# Patient Record
Sex: MALE | Race: WHITE | HISPANIC OR LATINO
[De-identification: names, ages, dates, MRNs, and addresses within clinical notes are randomized per-mention and may not be internally consistent; named-entity substitution may affect disease eponyms.]

---

## 2022-06-02 PROBLEM — Z00.00 ENCOUNTER FOR PREVENTIVE HEALTH EXAMINATION: Status: ACTIVE | Noted: 2022-06-02

## 2022-06-03 ENCOUNTER — APPOINTMENT (OUTPATIENT)
Dept: UROLOGY | Facility: CLINIC | Age: 32
End: 2022-06-03

## 2022-08-05 ENCOUNTER — APPOINTMENT (OUTPATIENT)
Dept: UROLOGY | Facility: CLINIC | Age: 32
End: 2022-08-05

## 2022-08-05 ENCOUNTER — NON-APPOINTMENT (OUTPATIENT)
Age: 32
End: 2022-08-05

## 2022-08-05 VITALS
BODY MASS INDEX: 22.81 KG/M2 | HEART RATE: 78 BPM | HEIGHT: 69 IN | WEIGHT: 154 LBS | DIASTOLIC BLOOD PRESSURE: 72 MMHG | TEMPERATURE: 98.4 F | SYSTOLIC BLOOD PRESSURE: 111 MMHG

## 2022-08-05 DIAGNOSIS — R59.0 LOCALIZED ENLARGED LYMPH NODES: ICD-10-CM

## 2022-08-05 PROCEDURE — 99204 OFFICE O/P NEW MOD 45 MIN: CPT

## 2022-08-05 NOTE — HISTORY OF PRESENT ILLNESS
[FreeTextEntry1] : 31M\par comes in with complaint of swelling in groin lymph nodes every time after sexual activity with his girlfriend\par lately feels swelling over groin and shaft\par STD and UTI testing are all negative\par +rash in head of penis\par feels it could be balanitis\par "feels weird" when he urinates\par no testicular pain\par pain in groin\par no dysorgasmia \par reports its uncomfortable to touch\par

## 2022-08-05 NOTE — ASSESSMENT
[FreeTextEntry1] : inguinal adenopathy\par no skin evidence of STD or malignancy\par unusual presentation\par CT pelvis\par if neg CT consider gen surg eval

## 2022-08-05 NOTE — PHYSICAL EXAM
[Urethral Meatus] : meatus normal [Penis Abnormality] : normal uncircumcised penis [Urinary Bladder Findings] : the bladder was normal on palpation [Scrotum] : the scrotum was normal [FreeTextEntry1] : +inguinal adenopathy on exam. no erythema of penis

## 2022-08-08 LAB
APPEARANCE: CLEAR
BACTERIA: NEGATIVE
BILIRUBIN URINE: NEGATIVE
BLOOD URINE: NEGATIVE
C TRACH RRNA SPEC QL NAA+PROBE: NOT DETECTED
COLOR: YELLOW
GLUCOSE QUALITATIVE U: NEGATIVE
HYALINE CASTS: 0 /LPF
KETONES URINE: NEGATIVE
LEUKOCYTE ESTERASE URINE: ABNORMAL
MICROSCOPIC-UA: NORMAL
N GONORRHOEA RRNA SPEC QL NAA+PROBE: NOT DETECTED
NITRITE URINE: NEGATIVE
PH URINE: 5.5
PROTEIN URINE: NORMAL
RED BLOOD CELLS URINE: 2 /HPF
SOURCE AMPLIFICATION: NORMAL
SPECIFIC GRAVITY URINE: 1.03
SQUAMOUS EPITHELIAL CELLS: 10 /HPF
UROBILINOGEN URINE: NORMAL
WHITE BLOOD CELLS URINE: 41 /HPF

## 2023-06-18 ENCOUNTER — NON-APPOINTMENT (OUTPATIENT)
Age: 33
End: 2023-06-18